# Patient Record
(demographics unavailable — no encounter records)

---
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
## 2018-02-10 NOTE — PCM.PN
- General Info


Date of Service: 02/10/18


Subjective Update: 





Fay has been stable since admission, hemoglobin has remained in the range of 

9 following hydration with no further evidence of active bleeding. EGD from 

this morning did show an area of redundant mucosa in the region of the junction 

with some erosions, old blood within the stomach, as well as some erosions in 

the antrum.


Functional Status: Reports: Pain Controlled, Urinating





- Review of Systems


General: Reports: Weakness.  Denies: Fever, Chills


Pulmonary: Reports: No Symptoms


Cardiovascular: Reports: No Symptoms


Gastrointestinal: Denies: Abdominal Pain, Difficulty Swallowing, Hematochezia, 

Melena, Nausea, Vomiting





- Patient Data


Vitals - Most Recent: 


 Last Vital Signs











Temp  97.8 F   02/10/18 10:07


 


Pulse  68   02/10/18 10:48


 


Resp  18   02/10/18 10:48


 


BP  106/60   02/10/18 10:48


 


Pulse Ox  98   02/10/18 10:48











Weight - Most Recent: 173 lb 11.588 oz


I&O - Last 24 Hours: 


 Intake & Output











 02/09/18 02/10/18 02/10/18





 22:59 06:59 14:59


 


Intake Total  1174 


 


Output Total 950 700 450


 


Balance -950 474 -450











Lab Results Last 24 Hours: 


 Laboratory Results - last 24 hr











  02/09/18 02/09/18 02/10/18 Range/Units





  20:01 20:55 01:00 


 


WBC     (4.5-11.0)  K/uL


 


RBC     (3.30-5.50)  M/uL


 


Hgb   9.4 L  8.7 L  (12.0-15.0)  g/dL


 


Hct     (36.0-48.0)  %


 


MCV     (80-98)  fL


 


MCH     (27-31)  pg


 


MCHC     (32-36)  %


 


Plt Count     (150-400)  K/uL


 


Neut % (Auto)     (36-66)  %


 


Lymph % (Auto)     (24-44)  %


 


Mono % (Auto)     (2-6)  %


 


Eos % (Auto)     (2-4)  %


 


Baso % (Auto)     (0-1)  %


 


Sodium     (140-148)  mmol/L


 


Potassium     (3.6-5.2)  mmol/L


 


Chloride     (100-108)  mmol/L


 


Carbon Dioxide     (21-32)  mmol/L


 


Anion Gap     (5.0-14.0)  mmol/L


 


BUN     (7-18)  mg/dL


 


Creatinine     (0.6-1.0)  mg/dL


 


Est Cr Clr Drug Dosing     mL/min


 


Estimated GFR (MDRD)     (>60)  


 


Glucose     ()  mg/dL


 


Calcium     (8.5-10.1)  mg/dL


 


Magnesium     (1.8-2.4)  mg/dL


 


Urine Color  Yellow    


 


Urine Appearance  Clear    


 


Urine pH  6.5    (4.5-8.0)  


 


Ur Specific Gravity  1.015    (1.008-1.030)  


 


Urine Protein  Negative    (NEGATIVE)  mg/dL


 


Urine Glucose (UA)  Normal    (NEGATIVE)  mg/dL


 


Urine Ketones  15 H    (NEGATIVE)  mg/dL


 


Urine Occult Blood  Negative    (NEGATIVE)  


 


Urine Nitrite  Negative    (NEGATIVE)  


 


Urine Bilirubin  Negative    (NEGATIVE)  


 


Urine Urobilinogen  Normal    (NORMAL)  mg/dL


 


Ur Leukocyte Esterase  Moderate    (NEGATIVE)  


 


Urine RBC  0-5    (0-5)  


 


Urine WBC  0-5    (0-5)  


 


Ur Epithelial Cells  Few    


 


Amorphous Sediment  Not seen    


 


Urine Bacteria  Not seen    


 


Urine Mucus  Not seen    














  02/10/18 02/10/18 Range/Units





  05:28 05:28 


 


WBC  13.8 H   (4.5-11.0)  K/uL


 


RBC  3.05 L   (3.30-5.50)  M/uL


 


Hgb  9.4 L   (12.0-15.0)  g/dL


 


Hct  28.5 L   (36.0-48.0)  %


 


MCV  93   (80-98)  fL


 


MCH  31   (27-31)  pg


 


MCHC  33   (32-36)  %


 


Plt Count  329   (150-400)  K/uL


 


Neut % (Auto)  68 H   (36-66)  %


 


Lymph % (Auto)  28   (24-44)  %


 


Mono % (Auto)  4   (2-6)  %


 


Eos % (Auto)  0 L   (2-4)  %


 


Baso % (Auto)  0   (0-1)  %


 


Sodium   145  (140-148)  mmol/L


 


Potassium   3.8  (3.6-5.2)  mmol/L


 


Chloride   113 H  (100-108)  mmol/L


 


Carbon Dioxide   23  (21-32)  mmol/L


 


Anion Gap   12.8  (5.0-14.0)  mmol/L


 


BUN   33 H  (7-18)  mg/dL


 


Creatinine   0.8  (0.6-1.0)  mg/dL


 


Est Cr Clr Drug Dosing   53.23  mL/min


 


Estimated GFR (MDRD)   > 60  (>60)  


 


Glucose   92  ()  mg/dL


 


Calcium   8.2 L  (8.5-10.1)  mg/dL


 


Magnesium   1.8  (1.8-2.4)  mg/dL


 


Urine Color    


 


Urine Appearance    


 


Urine pH    (4.5-8.0)  


 


Ur Specific Gravity    (1.008-1.030)  


 


Urine Protein    (NEGATIVE)  mg/dL


 


Urine Glucose (UA)    (NEGATIVE)  mg/dL


 


Urine Ketones    (NEGATIVE)  mg/dL


 


Urine Occult Blood    (NEGATIVE)  


 


Urine Nitrite    (NEGATIVE)  


 


Urine Bilirubin    (NEGATIVE)  


 


Urine Urobilinogen    (NORMAL)  mg/dL


 


Ur Leukocyte Esterase    (NEGATIVE)  


 


Urine RBC    (0-5)  


 


Urine WBC    (0-5)  


 


Ur Epithelial Cells    


 


Amorphous Sediment    


 


Urine Bacteria    


 


Urine Mucus    











Med Orders - Current: 


 Current Medications





Acetaminophen (Tylenol)  650 mg PO Q4H PRN


   PRN Reason: Pain (Mild 1-3)/fever


   Last Admin: 02/10/18 10:37 Dose:  650 mg


Levothyroxine Sodium (Synthroid)  50 mcg PO ACBREAKFAST [facility]


   Last Admin: 02/10/18 10:37 Dose:  50 mcg


Morphine Sulfate (Morphine)  2 mg IVPUSH Q2H PRN


   PRN Reason: Pain (severe 7-10)


Ondansetron HCl (Zofran)  4 mg IV Q4H PRN


   PRN Reason: Nausea/Vomiting


Pantoprazole Sodium (Protonix Iv***)  40 mg IVPUSH Q12H [facility]


Sodium Chloride (Saline Flush)  10 ml FLUSH ASDIRECTED PRN


   PRN Reason: Keep Vein Open





Discontinued Medications





Fentanyl (Sublimaze) Confirm Administered Dose 100 mcg .ROUTE .STK-MED ONE


   Stop: 02/10/18 09:00


Sodium Chloride (Normal Saline)  1,000 mls @ 999 mls/hr IV ASDIRECTED [facility]


   Last Admin: 02/09/18 18:21 Dose:  999 mls/hr


Potassium Chloride 20 meq/ (Premix)  100 mls @ 50 mls/hr IV ONETIME ONE


   Stop: 02/09/18 20:38


   Last Admin: 02/09/18 19:12 Dose:  50 mls/hr


Sodium Chloride (Normal Saline)  1,000 mls @ 125 mls/hr IV ASDIRECTED [facility]


   Last Admin: 02/09/18 22:15 Dose:  125 mls/hr


Pantoprazole Sodium 80 mg/ (Sodium Chloride)  100 mls @ 10 mls/hr IV ASDIRECTED 

[facility]


   Last Admin: 02/10/18 06:02 Dose:  10 mls/hr


Sodium Chloride (Normal Saline)  1,000 mls @ 50 mls/hr IV ASDIRECTED [facility]


   Last Admin: 02/10/18 09:49 Dose:  50 mls/hr


Lidocaine HCl (Xylocaine-Mpf 1%) Confirm Administered Dose 5 ml .ROUTE .STK-MED 

ONE


   Stop: 02/09/18 19:07


   Last Admin: 02/09/18 19:13 Dose:  2 ml


Ondansetron HCl (Zofran)  4 mg IVPUSH ONETIME ONE


   Stop: 02/09/18 18:18


   Last Admin: 02/09/18 18:24 Dose:  4 mg


Pantoprazole Sodium (Protonix Iv***)  80 mg IVPUSH ONETIME ONE


   Stop: 02/09/18 20:08


   Last Admin: 02/09/18 20:54 Dose:  80 mg


Propofol (Diprivan  20 Ml) Confirm Administered Dose 200 mg .ROUTE .STK-MED ONE


   Stop: 02/10/18 09:00











- Exam


Quality Assessment: DVT Prophylaxis


General: Alert, Oriented, Cooperative, Mild Distress


Lungs: Clear to Auscultation, Normal Respiratory Effort


Cardiovascular: Regular Rate, Regular Rhythm, No Murmurs


GI/Abdominal Exam: Soft, Non-Tender, No Organomegaly, No Distention


Extremities: Non-Tender, No Pedal Edema


Skin: Warm, Dry, Intact





- Problem List Review


Problem List Initiated/Reviewed/Updated: Yes





- My Orders


Last 24 Hours: 


My Active Orders





02/09/18 19:26


Resuscitation Status Routine 





02/09/18 20:07


Patient Status [ADT] Routine 


Ambulate [RC] QID 


Cardiac Monitoring [RC] .As Directed 


Height and Weight [RC] DAILY 


Intake and Output [RC] QSHIFT 


Notify Provider Consults [RC] ASDIRECTED 


Notify Provider Vital Signs [RC] ASDIRECTED 


Oxygen Therapy [RC] PRN 


Peripheral IV Care [RC] .AS DIRECTED 


Up With Assistance [RC] ASDIRECTED 


Up to Chair [RC] QID 


Vital Signs [RC] Q1H 


Consult to Physician [CONS] Routine 


Acetaminophen [Tylenol]   650 mg PO Q4H PRN 


Morphine   2 mg IVPUSH Q2H PRN 


Ondansetron [Zofran]   4 mg IV Q4H PRN 


Sodium Chloride 0.9% [Saline Flush]   10 ml FLUSH ASDIRECTED PRN 


Peripheral IV Insertion Adult [OM.PC] Routine 


Sequential Compression Device [OM.PC] Per Unit Routine 


VTE Pharmacological Contraindications [AST] Per Unit Routine 





02/10/18 07:30


Levothyroxine [Synthroid]   50 mcg PO ACBREAKFAST 





02/10/18 10:50


HEMOGLOBIN [HEME] Stat 


Convert IV to Saline Lock [OM.PC] Routine 





02/10/18 11:00


Pantoprazole [ProTONIX IV***]   40 mg IVPUSH Q12H 





02/11/18 05:00


BASIC METABOLIC PANEL,BMP [CHEM] Timed 


CBC WITH AUTO DIFF [HEME] Timed 














- Plan


Plan:: 





ASSESSMENT AND PLAN





UPPER GI BLEED-no further evidence of active bleeding since admission, 

hemoglobin has stabilized at 9. EGD showed redundant mucosa almost in the form 

of a large polyp at the EG junction with erosions, old blood in the stomach, 

and erosions in the antrum.


-Protonix 40 mg IV every 12 hours


-Start and maintain 2 large-bore IV sites


-Clear liquid diet, advance as tolerated


-Type and cross to hold 2 units of red blood cells


-Surgical follow-up per Dr. Marte


-Serial hemoglobin levels





ACUTE BLOOD LOSS ANEMIA


-Management as above





MAINTENANCE ISSUES


-DVT prophylaxis; SCUDs, hold on anticoagulation because of acute GI bleed


-GI prophylaxis; Protonix as above


-Hair catheter; not indicated


-Nutrition; nothing by mouth


-Nicotine dependence; not required





CODE STATUS-FULL CODE





ADMISSION STATUS-patient will be admitted to inpatient status, expect at least 

a 2 night hospital stay for evaluation and management of problems as outlined 

above.  At the time of this admission I do not reasonably expected evaluation 

and management of this problem will require more than a 96 hour hospital stay.





DISPOSITION-anticipate discharge to home after the hospital stay.





PRIMARY CARE PROVIDER-

## 2018-02-11 NOTE — PCM.DCSUM1
**Discharge Summary





- Discharge Data


Discharge Date: 02/11/18


Discharge Disposition: Home, Self-Care 01


Condition: Fair





- Discharge Diagnosis/Problem(s)


(1) Acute blood loss anemia


SNOMED Code(s): 365932755


   ICD Code: D62 - ACUTE POSTHEMORRHAGIC ANEMIA   Status: Acute   Current Visit

: Yes   





(2) Gastric erosion with bleeding


Status: Acute   Current Visit: Yes   





- Patient Summary/Data


Consults: 


 Consultations





02/09/18 20:07


Consult to Physician [CONS] Routine 


   Consulting Provider: Kurtis Marte Call Completed to Consulting Physician: Yes


   Reason for Consult: Upper GI bleed











Hospital Course: 





Fay is a 68-year-old woman who was admitted through the emergency department 

with an upper GI bleed. She reports that she's had a previous history of lower 

GI bleeds, source not identified despite previous evaluations. She felt well 

until the morning of admission, when she noted a melenic-appearing stool, but 

felt well enough to work through the day. Late afternoon and early evening she 

was spending some time with friends and attempted to stand up but felt very 

lightheaded and fell back onto the couch. She did not pass out, but quickly 

thereafter did develop some nausea and retching. She is status post previous 

Nissen fundoplication so was unable to vomit. On evaluation in the emergency 

department she was found to be initially hypotensive, that improved following 

infusion of IV fluids. Hemoglobin was 10, no other significant laboratory 

abnormalities were identified. She denies any previous history of upper GI 

bleed or ulcer disease. Has not had recent symptoms of abdominal pain.





On admission IV fluids were continued, she was given a bolus of Protonix at 80 

mg followed by continuous infusion of Protonix at 8 mg per hour. Serial 

hemoglobin levels were obtained and did drop to the range of 9 following 

hydration. There was no further evidence of active bleeding throughout her 

hospital stay. Surgical consult was obtained with Dr. Marte on the day after 

admission and an EGD was performed. She was noted to have redundant mucosa 

almost in the form of a large polyp at the EG junction. There were erosions on 

this area felt to be possible cause of recent bleeding. Old blood was noted in 

the stomach. Also found was very of erosions around the antrum biopsies were 

also obtained from this area. By the following morning she had been tolerating 

a soft diet and had been walking short distances without significant 

difficulty. She will be discharged home on oral Protonix 40 mg twice daily for 

2 weeks, then once daily thereafter. She will continue to follow a soft low 

residue diet over the next 2 weeks. Activity will be as tolerated and she will 

be started on iron supplement twice daily. Follow-up appointment will be 

scheduled with Dr. Marte within one week, CBC will be obtained at that time 

and he will review biopsy results with the patient during that appointment. She 

will return to the emergency room immediately if she notes any recurrent 

symptoms including melenic-appearing stools.








- Patient Instructions


Diet: GI Soft/Low Residue/Low Fiber


Activity: As Tolerated


Other/Special Instructions: Please schedule follow-up appointment with Dr. Marte within one week, CBC to be obtained at the time of follow-up 

appointment.





- Discharge Plan


Prescriptions/Med Rec: 


Ferrous Gluconate 324 mg PO BID #60 tablet


Pantoprazole Sodium [Protonix] 40 mg PO BID #30 tablet.


Home Medications: 


 Home Meds





Dicyclomine [Bentyl] 10 mg PO TID PRN 06/01/16 [History]


Fluticasone Propionate [Flonase] 1 inh INH DAILY PRN 06/01/16 [History]


Hydrochlorothiazide 25 mg PO BID 06/01/16 [History]


Hydrocortisone [Hydrocortisone 2.5% Crm] 1 applic TOP BID PRN 06/01/16 [History]


Levothyroxine [Synthroid] 50 mcg PO DAILY 06/01/16 [History]


Ferrous Gluconate 324 mg PO BID #60 tablet 02/11/18 [Rx]


Pantoprazole Sodium [Protonix] 40 mg PO BID #30 tablet. 02/11/18 [Rx]








Referrals: 


PCP,None [Primary Care Provider] - 





- Patient Data


Vitals - Most Recent: 


 Last Vital Signs











Temp  98.6 F   02/11/18 08:00


 


Pulse  71   02/11/18 08:00


 


Resp  21 H  02/11/18 08:00


 


BP  101/63   02/11/18 08:00


 


Pulse Ox  93 L  02/11/18 08:00











Weight - Most Recent: 173 lb 11.588 oz


I&O - Last 24 hours: 


 Intake & Output











 02/10/18 02/11/18 02/11/18





 22:59 06:59 14:59


 


Intake Total 200  


 


Output Total 500  


 


Balance -300  











Lab Results - Last 24 hrs: 


 Laboratory Results - last 24 hr











  02/10/18 02/10/18 02/10/18 Range/Units





  11:00 16:16 22:08 


 


WBC   9.2  10.0  (4.5-11.0)  K/uL


 


RBC   2.75 L  2.62 L  (3.30-5.50)  M/uL


 


Hgb  8.8 L  8.4 L  8.1 L  (12.0-15.0)  g/dL


 


Hct   26.2 L  25.2 L  (36.0-48.0)  %


 


MCV   95  96  (80-98)  fL


 


MCH   31  31  (27-31)  pg


 


MCHC   32  32  (32-36)  %


 


Plt Count   300  269  (150-400)  K/uL


 


Neut % (Auto)     (36-66)  %


 


Lymph % (Auto)     (24-44)  %


 


Mono % (Auto)     (2-6)  %


 


Eos % (Auto)     (2-4)  %


 


Baso % (Auto)     (0-1)  %


 


Sodium     (140-148)  mmol/L


 


Potassium     (3.6-5.2)  mmol/L


 


Chloride     (100-108)  mmol/L


 


Carbon Dioxide     (21-32)  mmol/L


 


Anion Gap     (5.0-14.0)  mmol/L


 


BUN     (7-18)  mg/dL


 


Creatinine     (0.6-1.0)  mg/dL


 


Est Cr Clr Drug Dosing     mL/min


 


Estimated GFR (MDRD)     (>60)  


 


Glucose     ()  mg/dL


 


Calcium     (8.5-10.1)  mg/dL














  02/11/18 02/11/18 Range/Units





  05:53 05:53 


 


WBC  8.5   (4.5-11.0)  K/uL


 


RBC  2.77 L   (3.30-5.50)  M/uL


 


Hgb  8.6 L   (12.0-15.0)  g/dL


 


Hct  26.7 L   (36.0-48.0)  %


 


MCV  96   (80-98)  fL


 


MCH  31   (27-31)  pg


 


MCHC  32   (32-36)  %


 


Plt Count  278   (150-400)  K/uL


 


Neut % (Auto)  50   (36-66)  %


 


Lymph % (Auto)  41   (24-44)  %


 


Mono % (Auto)  4   (2-6)  %


 


Eos % (Auto)  5 H   (2-4)  %


 


Baso % (Auto)  1   (0-1)  %


 


Sodium   145  (140-148)  mmol/L


 


Potassium   4.3  (3.6-5.2)  mmol/L


 


Chloride   114 H  (100-108)  mmol/L


 


Carbon Dioxide   25  (21-32)  mmol/L


 


Anion Gap   10.3  (5.0-14.0)  mmol/L


 


BUN   20 H  (7-18)  mg/dL


 


Creatinine   0.8  (0.6-1.0)  mg/dL


 


Est Cr Clr Drug Dosing   53.23  mL/min


 


Estimated GFR (MDRD)   > 60  (>60)  


 


Glucose   101  ()  mg/dL


 


Calcium   8.3 L  (8.5-10.1)  mg/dL











Med Orders - Current: 


 Current Medications





Acetaminophen (Tylenol)  650 mg PO Q4H PRN


   PRN Reason: Pain (Mild 1-3)/fever


   Last Admin: 02/11/18 07:21 Dose:  650 mg


Levothyroxine Sodium (Synthroid)  50 mcg PO ACBREAKFAST [facility]


   Last Admin: 02/11/18 07:34 Dose:  50 mcg


Morphine Sulfate (Morphine)  2 mg IVPUSH Q2H PRN


   PRN Reason: Pain (severe 7-10)


Ondansetron HCl (Zofran)  4 mg IV Q4H PRN


   PRN Reason: Nausea/Vomiting


Pantoprazole Sodium (Protonix Iv***)  40 mg IVPUSH Q12H [facility]


   Last Admin: 02/10/18 22:49 Dose:  40 mg


Sodium Chloride (Saline Flush)  10 ml FLUSH ASDIRECTED PRN


   PRN Reason: Keep Vein Open


Tramadol HCl (Ultram)  50 mg PO Q4H PRN


   PRN Reason: Pain





Discontinued Medications





Fentanyl (Sublimaze) Confirm Administered Dose 100 mcg .ROUTE .STK-MED ONE


   Stop: 02/10/18 09:00


Sodium Chloride (Normal Saline)  1,000 mls @ 999 mls/hr IV ASDIRECTED [facility]


   Last Admin: 02/09/18 18:21 Dose:  999 mls/hr


Potassium Chloride 20 meq/ (Premix)  100 mls @ 50 mls/hr IV ONETIME ONE


   Stop: 02/09/18 20:38


   Last Admin: 02/09/18 19:12 Dose:  50 mls/hr


Sodium Chloride (Normal Saline)  1,000 mls @ 125 mls/hr IV ASDIRECTED [facility]


   Last Admin: 02/09/18 22:15 Dose:  125 mls/hr


Pantoprazole Sodium 80 mg/ (Sodium Chloride)  100 mls @ 10 mls/hr IV ASDIRECTED 

[facility]


   Last Admin: 02/10/18 06:02 Dose:  10 mls/hr


Sodium Chloride (Normal Saline)  1,000 mls @ 50 mls/hr IV ASDIRECTED [facility]


   Last Admin: 02/10/18 09:49 Dose:  50 mls/hr


Lidocaine HCl (Xylocaine-Mpf 1%) Confirm Administered Dose 5 ml .ROUTE .STK-MED 

ONE


   Stop: 02/09/18 19:07


   Last Admin: 02/09/18 19:13 Dose:  2 ml


Ondansetron HCl (Zofran)  4 mg IVPUSH ONETIME ONE


   Stop: 02/09/18 18:18


   Last Admin: 02/09/18 18:24 Dose:  4 mg


Pantoprazole Sodium (Protonix Iv***)  80 mg IVPUSH ONETIME ONE


   Stop: 02/09/18 20:08


   Last Admin: 02/09/18 20:54 Dose:  80 mg


Propofol (Diprivan  20 Ml) Confirm Administered Dose 200 mg .ROUTE .STK-MED ONE


   Stop: 02/10/18 09:00











*Q Meaningful Use (DIS)





- VTE *Q


VTE Criteria *Q: 





VTE Pharmacological Contraindications *Q: Active Hemorrhage





- Stroke *Q


Stroke Criteria *Q: 








- AMI *Q


AMI Criteria *Q:

## 2018-02-12 NOTE — OR
DATE OF PROCEDURE:  02/10/2018

 

PREOPERATIVE DIAGNOSIS:  Gastrointestinal hemorrhage.

 

POSTOPERATIVE DIAGNOSES:

1. Proximal stomach mass.

2. Gastrointestinal hemorrhage.

3. Mild antral gastritis.

 

PROCEDURE:  Esophagogastroduodenoscopy with biopsy of proximal gastric mass, 
biopsy of

antrum for CLOtest and for pathology to look for Helicobacter pylori.



SURGEON:  Kurtis Marte MD.

 

ASSISTANTS:  Present for observation, Dr. Alvarado, the attending; and

Constantino Tapia MS-3.

 

ANESTHESIA:  IV anesthesia with monitored anesthesia care.

 

INDICATION:  This 68-year-old white female was admitted by Dr. Alvarado last 
night after

having melenic stools.  Her hemoglobin fell as low as 8.7.  She did receive 
blood

transfusion.  She has a history of Nissen fundoplication.  A request was made 
for upper

endoscopy.  I counseled her for this and she gave her informed consent to 
proceed.

 

DESCRIPTION OF PROCEDURE:  The patient was placed in the left lateral decubitus 
position.

IV anesthesia was administered by the Anesthesia Service.  Time-out was held.  
The flexible

video Olympus upper endoscope was passed through her mouth, down her esophagus, 
and into 

her stomach.  The scope was easily passed through the pylorus into the duodenum
, reaching 

its third portion.  The scope was then slowly withdrawn, examining the mucosa 
throughout.  The

duodenal mucosa appeared unremarkable.  The scope was brought up through the 
pylorus.  

There was some erythema in the antrum suggestive of mild antral gastritis.  We 
obtained 

biopsies of this for CLOtest, sent for pathology to look for Helicobacter 
pylori.  The scope was

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 
gastrointestinal

stromal tumor.  We did biopsy this mass.  There was nothing actively bleeding 
at the present

time.  The scope was then brought up to the GE junction.  This appeared 
unremarkable.  It

was then brought up through the unremarkable appearing esophagus and was 
removed.  She

tolerated the procedure well.

 

 

 

 

Kurtis Marte MD

DD:  02/10/2018 09:58:40

DT:  02/10/2018 11:11:27

Job #:  [id number]

MTDD

## 2018-02-17 NOTE — PCM.SURGPN
- General Info


Date of Service: 02/17/18


Date of Surgery/Procedure: 02/16/18


POD#: 1


Post-Op Diagnosis: S/P esophagogastrectomy with manny-en-y and 

esophagojejunostomy with removal of mucosal mass


Admission Diagnosis/Problem: Gastrectomy (Removal of gastric mucosal mass )


Functional Status: Reports: Pain Controlled





- Review of Systems


General: Reports: No Symptoms


HEENT: Reports: No Symptoms


Pulmonary: Reports: No Symptoms


Cardiovascular: Reports: No Symptoms


Gastrointestinal: Reports: Abdominal Pain, Other (Complaining of some regurg 

and burpgin but no dirrhea, vomiting or nausea )


Genitourinary: Reports: No Symptoms


Musculoskeletal: Reports: No Symptoms


Skin: Reports: No Symptoms


Neurological: Reports: No Symptoms


Psychiatric: Reports: No Symptoms





- Patient Data


Vitals - Most Recent: 


 Last Vital Signs











Temp  37.2 C   02/17/18 07:38


 


Pulse  64   02/17/18 07:38


 


Resp  15   02/17/18 07:38


 


BP  125/71   02/17/18 07:38


 


Pulse Ox  95   02/17/18 07:50











Weight - Most Recent: 75.013 kg


I&O - Last 24 Hours: 


 Intake & Output











 02/16/18 02/17/18 02/17/18





 22:59 06:59 14:59


 


Intake Total 50 2528 


 


Output Total 490 565 


 


Balance -440 1963 











Lab Results Last 24 Hrs: 


 Laboratory Results - last 24 hr











  02/16/18 02/16/18 02/16/18 Range/Units





  11:00 11:00 11:00 


 


WBC  8.4    (4.5-11.0)  K/uL


 


RBC  3.37    (3.30-5.50)  M/uL


 


Hgb  10.2 L    (12.0-15.0)  g/dL


 


Hct  31.6 L    (36.0-48.0)  %


 


MCV  94    (80-98)  fL


 


MCH  30    (27-31)  pg


 


MCHC  32    (32-36)  %


 


Plt Count  476 H    (150-400)  K/uL


 


Sodium   140   (140-148)  mmol/L


 


Potassium   3.5 L   (3.6-5.2)  mmol/L


 


Chloride   105   (100-108)  mmol/L


 


Carbon Dioxide   25   (21-32)  mmol/L


 


Anion Gap   13.5   (5.0-14.0)  mmol/L


 


BUN   15   (7-18)  mg/dL


 


Creatinine   0.9   (0.6-1.0)  mg/dL


 


Est Cr Clr Drug Dosing   47.32   mL/min


 


Estimated GFR (MDRD)   > 60   (>60)  


 


Glucose   109 H   ()  mg/dL


 


Calcium   9.1   (8.5-10.1)  mg/dL


 


Phosphorus   4.2   (2.5-4.9)  mg/dL


 


Magnesium   2.1   (1.8-2.4)  mg/dL


 


Total Bilirubin   0.4   (0.2-1.0)  mg/dL


 


AST   22   (15-37)  U/L


 


ALT   30   (12-78)  U/L


 


Alkaline Phosphatase   62   ()  U/L


 


Total Protein   6.9   (6.4-8.2)  g/dL


 


Albumin   4.1   (3.4-5.0)  g/dL


 


Globulin   2.8   (2.3-3.5)  g/dL


 


Albumin/Globulin Ratio   1.5   (1.2-2.2)  


 


TSH, Ultra Sensitive   1.801   (0.358-3.740)  uIU/mL


 


Blood Type    O POSITIVE  


 


Gel Antibody Screen    Negative  











Med Orders - Current: 


 Current Medications





Acetaminophen (Tylenol)  650 mg PO Q6H [facility]


   Last Admin: 02/17/18 05:12 Dose:  650 mg


Celecoxib (Celebrex)  200 mg PO DAILY@0800 [facility]


   Last Admin: 02/17/18 07:51 Dose:  200 mg


Cyanocobalamin (Vitamin B12)  1,000 mcg IM ONETIME ONE


   Stop: 02/18/18 09:01


Dextrose/Water (Dextrose 50% In Water)  50 ml IVPUSH ONETIME PRN


   PRN Reason: ACCUCHECK LESS THAN 70


Diphenhydramine HCl (Benadryl)  25 - 50 mg IVPUSH Q4H PRN


   PRN Reason: ITCHING


Gabapentin (Neurontin)  300 mg PO TID [facility]


   Last Admin: 02/16/18 20:07 Dose:  300 mg


Glucagon (Glucagen)  1 mg IM ONETIME PRN


   PRN Reason: ACCUCHECK LESS THAN 70


Heparin Sodium (Porcine) (Heparin Sodium)  5,000 units SUBCUT Q12H [facility]


   Last Admin: 02/17/18 07:52 Dose:  5,000 units


Hydromorphone HCl (Dilaudid Pca 15 Mg In Ns 30 Ml)  0 mg IV ASDIRECTED PRN; 

Protocol


   PRN Reason: Pain


Hydroxyzine HCl (Vistaril)  75 - 100 mg IM Q4H PRN


   PRN Reason: pain


Cefoxitin Sodium 2 gm/ Sodium (Chloride)  50 mls @ 100 mls/hr IV Q6H [facility]


   Stop: 02/17/18 18:29


   Last Admin: 02/17/18 05:12 Dose:  100 mls/hr


Dextrose/Lactated Ringer's (Dextrose 5%-Lactated Ringers)  1,000 mls @ 100 mls/

hr IV ASDIRECTED [facility]


Multivitamins/Minerals 10 ml/Thiamine HCl 200 mg/ Chromium/Copper/Manganese/

Seleni/Zn 1 ml/ Dextrose/Lactated Ringer's  1,013 mls @ 100 mls/hr IV DAILY@

1600 [facility]


Insulin Aspart (Novolog)  0 unit SUBCUT Q6H PRN; Protocol


   PRN Reason: PER CORRECTIONAL DOSING


   Last Admin: 02/17/18 04:19 Dose:  5 unit


Labetalol HCl (Normodyne)  5 - 15 mg IVPUSH Q1H PRN


   PRN Reason: SBP over 160 OR DBP over 95


Metoclopramide HCl (Reglan)  10 mg IVPUSH Q6H PRN


   PRN Reason: NAUSEA NOT CONTROL BY ZOFRAN


Miscellaneous Information (Remove Patch)  1 ea TRDERM ONETIME ONE


   Stop: 02/18/18 10:01


Naloxone HCl (Narcan)  0.1 mg IV ASDIRECTED PRN


   PRN Reason: decreased respiratory rate


Scopolamine Patch (Check)  1 each TOP DAILY [facility]


   Stop: 02/18/18 10:00


   Last Admin: 02/16/18 17:36 Dose:  1 each


Ondansetron HCl (Zofran)  4 mg IVPUSH Q4H PRN


   PRN Reason: Nausea/Vomiting


   Last Admin: 02/16/18 22:34 Dose:  4 mg


Pantoprazole Sodium (Protonix Iv***)  40 mg IVPUSH Q24H [facility]


   Last Admin: 02/16/18 17:30 Dose:  40 mg


Scopolamine (Transderm-Scop)  1.5 mg TOP Q72H [facility]


   Stop: 02/18/18 10:00


   Last Admin: 02/16/18 10:56 Dose:  1.5 mg





Discontinued Medications





Acetaminophen (Tylenol Extra Strength)  1,000 mg PO ONETIME ONE


   Stop: 02/16/18 09:31


   Last Admin: 02/16/18 10:57 Dose:  1,000 mg


Cefoxitin Sodium (Mefoxin) Confirm Administered Dose 2 gm .ROUTE .STK-MED ONE


   Stop: 02/16/18 07:09


   Last Admin: 02/16/18 13:50 Dose:  2 gm


Celecoxib (Celebrex)  200 mg PO ONETIME ONE


   Stop: 02/16/18 09:31


   Last Admin: 02/16/18 10:57 Dose:  200 mg


Ropivacaine 38 ml/Dexamethasone 8 mg/Epinephrine HCl 0.4 mg/ Sodium Chloride 

39.6 ml  0 ml NERVRT ASDIRECTED [facility]


   Last Admin: 02/16/18 13:08 Dose:  2 syringe


Dexamethasone (Dexamethasone) Confirm Administered Dose 4 mg .ROUTE .STK-MED ONE


   Stop: 02/16/18 11:58


Fentanyl (Sublimaze) Confirm Administered Dose 250 mcg .ROUTE .STK-MED ONE


   Stop: 02/16/18 11:57


Gabapentin (Neurontin)  300 mg PO ONETIME ONE


   Stop: 02/16/18 09:31


   Last Admin: 02/16/18 10:57 Dose:  300 mg


Glycopyrrolate () Confirm Administered Dose 1 mg .ROUTE .STK-MED ONE


   Stop: 02/16/18 11:58


Cefoxitin Sodium 2 gm/ Sodium (Chloride)  50 mls @ 100 mls/hr IV ONETIME ONE


   Stop: 02/16/18 10:29


   Last Admin: 02/16/18 12:54 Dose:  100 mls/hr


Lidocaine HCl/Dextrose (Lidocaine 2 Gm/D5w 500 Ml)  2 gm in 500 mls @ 30 mls/hr 

IV .[id number] DANAE


   PRN Reason: 2 MG/MIN


Ketamine HCl 100 mg/ Sodium (Chloride)  100 mls @ 15 mls/hr IV ASDIRECTED [facility]


   PRN Reason: 5 MCG/KG/MIN


Lidocaine HCl/Dextrose (Lidocaine 2 Gm/D5w 500 Ml)  2 gm in 500 mls @ 22.5 mls/

hr IV .[id number] DANAE


   PRN Reason: 1.5 MG/MIN


   Stop: 02/16/18 23:59


   Last Admin: 02/16/18 17:21 Dose:  Not Given


Dextrose/Lactated Ringer's (Dextrose 5%-Lactated Ringers)  1,000 mls @ 100 mls/

hr IV ASDIRECTED [facility]


   Last Admin: 02/16/18 10:59 Dose:  100 mls/hr


Lactated Ringer's (Ringers, Lactated) Confirm Administered Dose 1,000 mls @ as 

directed .ROUTE .STK-MED ONE


   Stop: 02/16/18 12:42


Dextrose/Lactated Ringer's (Dextrose 5%-Lactated Ringers)  1,000 mls [facility]


   Last Admin: 02/17/18 06:18 Dose:  175 mls/hr


Multivitamins/Minerals 10 ml/Thiamine HCl 200 mg/ Chromium/Copper/Manganese/

Seleni/Zn 1 ml/ Dextrose/Lactated Ringer's  1,013 mls @ 175 mls/hr IV DAILY@

1600 [facility]


   Last Admin: 02/16/18 17:30 Dose:  175 mls/hr


Iohexol (Omnipaque-300)  50 ml PO .AS DIRECTED PRN


   PRN Reason: RADIOLOGY EXAM


   Stop: 02/18/18 02:08


[facility]  [facility]


Lidocaine HCl (Xylocaine 2%)  90 mg IVPUSH ONETIME ONE


   Stop: 02/16/18 12:01


   Last Admin: 02/16/18 17:22 Dose:  Not Given


Neostigmine Methylsulfate (Neostigmine) Confirm Administered Dose 5 mg .ROUTE 

.STK-MED ONE


   Stop: 02/16/18 11:58


Ondansetron HCl (Zofran) Confirm Administered Dose 4 mg .ROUTE .STK-MED ONE


   Stop: 02/16/18 11:58


Propofol (Diprivan  20 Ml) Confirm Administered Dose 200 mg .ROUTE .STK-MED ONE


   Stop: 02/16/18 11:58


Rocuronium Bromide (Zemuron) Confirm Administered Dose 50 mg .ROUTE .STK-MED ONE


   Stop: 02/16/18 11:58


Succinylcholine Chloride (Succinylcholine In Ns Pf) Confirm Administered Dose 

200 mg .ROUTE .STK-MED ONE


   Stop: 02/16/18 11:58











- Exam


Wound/Incisions: Healing Well


General: Alert, Oriented


Neck: Supple


Lungs: Clear to Auscultation, Normal Respiratory Effort


Cardiovascular: Regular Rate, Regular Rhythm


GI/Abdominal Exam: Soft, No Distention, Tender


Skin: Warm, Dry, Intact


Neurological: No New Focal Deficit


Psy/Mental Status: Alert, Normal Affect, Normal Mood





- Problem List Review


Problem List Initiated/Reviewed/Updated: Yes





- My Orders


Last 24 Hours: 


 Active Orders 24 hr











 Category Date Time Status


 


 Patient Status [ADT] Routine ADT  02/16/18 16:20 Active


 


 Ambulate [RC] ASDIRECTED Care  02/16/18 16:58 Active


 


 Cardiac Monitoring Discontinue [RC] Click to Edit Care  02/17/18 07:35 Active


 


 Cardiac Monitoring Discontinue [RC] Click to Edit Care  02/17/18 09:00 Active


 


 Cardiac Monitoring [RC] .As Directed Care  02/16/18 16:58 Active


 


 Communication Order [RC] ASDIRECTED Care  02/18/18 04:00 Active


 


 Communication Order [RC] Q4H Care  02/16/18 16:58 Active


 


 Communication Order [RC] ROUTINE Care  02/16/18 16:58 Active


 


 Communication Order [RC] ROUTINE Care  02/17/18 07:37 Active


 


 Communication Order [RC] STAT Care  02/16/18 14:49 Active


 


 Drain Management [RC] ASDIRECTED Care  02/16/18 16:58 Active


 


 Head of Bed Elevation [RC] CONTINUOUS Care  02/16/18 16:58 Active


 


 IS (RT) [RT Incentive Spirometry] [RC] ASDIRECTED Care  02/16/18 09:30 Active


 


 Insert Urinary Catheter [OM.PC] Per Unit Routine Care  02/16/18 16:58 Ordered


 


 Insert Urinary Catheter [OM.PC] Per Unit Routine Care  02/16/18 16:58 Ordered


 


 Intake and Output [RC] ASDIRECTED Care  02/16/18 16:58 Active


 


 Notify Provider Intake and Out [RC] ASDIRECTED Care  02/16/18 16:58 Active


 


 Notify Provider [RC] PRN Care  02/16/18 14:49 Active


 


 Oxygen Therapy [RC] ASDIRECTED Care  02/16/18 16:58 Active


 


 POC Glucose [Blood Glucose Check, Bedside] [RC] Q6HR Care  02/16/18 22:00 

Active


 


 Pneumonia Education [RC] UPON Care  02/16/18 16:58 Active


 


 Pulse Oximetry [RC] CONTINUOUS Care  02/16/18 14:49 Active


 


 RT BiPAP/CPAP [RC] ASDIRECTED Care  02/16/18 16:58 Active


 


 RT Incentive Spirometry [RC] Q1HWA Care  02/16/18 16:58 Active


 


 Turn, Cough, Deep Breathe [RC] Q1HWA Care  02/16/18 16:58 Active


 


 Up to Chair [RC] TIDMEALS Care  02/16/18 16:58 Active


 


 Vital Signs [RC] PER UNIT ROUTINE Care  02/16/18 16:58 Active


 


 Consult to Bariatric Services [CONS] Routine Cons  02/16/18 16:58 Active


 


 Consult to Dietician [CONS] Routine Cons  02/19/18 09:00 Active


 


 Respiratory Care Assess and Treatment [CONS] Routine Cons  02/16/18 16:58 

Active


 


 Bariatric Diet [DIET] Diet  02/16/18 Dinner Active


 


 UGI wo KUB [CR] Timed Exams  02/17/18 04:00 Ordered


 


 GLUCOSE POC LAB TO COLLECT [POC] Q6H Lab  02/17/18 10:00 Ordered


 


 GLUCOSE POC LAB TO COLLECT [POC] Q6H Lab  02/17/18 16:00 Ordered


 


 GLUCOSE POC LAB TO COLLECT [POC] Q6H Lab  02/17/18 22:00 Ordered


 


 GLUCOSE POC LAB TO COLLECT [POC] Q6H Lab  02/18/18 04:00 Ordered


 


 GLUCOSE POC LAB TO COLLECT [POC] Q6H Lab  02/18/18 10:00 Ordered


 


 GLUCOSE POC LAB TO COLLECT [POC] Q6H Lab  02/18/18 16:00 Ordered


 


 GLUCOSE POC LAB TO COLLECT [POC] Q6H Lab  02/18/18 22:00 Ordered


 


 GLUCOSE POC LAB TO COLLECT [POC] Q6H Lab  02/19/18 04:00 Ordered


 


 GLUCOSE POC LAB TO COLLECT [POC] Q6H Lab  02/19/18 10:00 Ordered


 


 GLUCOSE POC LAB TO COLLECT [POC] Q6H Lab  02/19/18 16:00 Ordered


 


 GLUCOSE POC LAB TO COLLECT [POC] Q6H Lab  02/19/18 22:00 Ordered


 


 GLUCOSE POC LAB TO COLLECT [POC] Q6H Lab  02/20/18 04:00 Ordered


 


 GLUCOSE POC LAB TO COLLECT [POC] Q6H Lab  02/20/18 10:00 Ordered


 


 GLUCOSE POC LAB TO COLLECT [POC] Q6H Lab  02/20/18 16:00 Ordered


 


 GLUCOSE POC LAB TO COLLECT [POC] Q6H Lab  02/20/18 22:00 Ordered


 


 GLYCOSYLATED HEMOGLOBIN,HGBA1C [CHEM] Routine Lab  02/17/18 07:35 Ordered


 


 Acetaminophen [Tylenol] Med  02/16/18 18:00 Active





 650 mg PO Q6H   


 


 Celecoxib [CeleBREX] Med  02/17/18 08:00 Active





 200 mg PO DAILY@0800   


 


 Cyanocobalamin (Vitamin B12) [Vitamin B12] Med  02/18/18 09:00 Once





 1,000 mcg IM ONETIME ONE   


 


 Dextrose 5%-Lactated Ringers 1,000 ml Med  02/17/18 07:45 Active





 IV ASDIRECTED   


 


 Dextrose 50% in Water Med  02/16/18 18:00 Active





 50 ml IVPUSH ONETIME PRN   


 


 Gabapentin [Neurontin] Med  02/16/18 21:00 Active





 300 mg PO TID   


 


 Glucagon,Human Recombinant [GlucaGen] Med  02/16/18 18:00 Active





 1 mg IM ONETIME PRN   


 


 HYDROmorphone/Normal Saline [Dilaudid PCA 15 MG in NS Med  02/16/18 14:49 

Active





 30 ML]   





 See Protocol  IV ASDIRECTED PRN   


 


 Heparin Sodium Med  02/16/18 20:00 Active





 5,000 units SUBCUT Q12H   


 


 Insulin Aspart [NovoLOG] Med  02/16/18 18:00 Active





 0 unit SUBCUT Q6H PRN   


 


 Labetalol [Normodyne] Med  02/16/18 18:00 Active





 5 - 15 mg IVPUSH Q1H PRN   


 


 MVI, Adult with Vitamin K [Infuvite Adult] 10 ml Med  02/17/18 16:00 Active





 Thiamine [Vitamin B-1] 200 mg   





 Chromium/Copper/[facility]/Selen/Zn [Multitrace-5 Concentrate   





 ] 1 ml   





 Dextrose 5%-Lactated Ringers 1,000 ml   





 IV DAILY@1600   


 


 Metoclopramide [Reglan] Med  02/16/18 18:00 Active





 10 mg IVPUSH Q6H PRN   


 


 Naloxone [Narcan] Med  02/16/18 15:01 Active





 0.1 mg IV ASDIRECTED PRN   


 


 Non-Formulary Medication [NF Drug] Med  02/16/18 15:00 Active





 1 each TOP DAILY   


 


 Ondansetron [Zofran] Med  02/16/18 18:00 Active





 4 mg IVPUSH Q4H PRN   


 


 Pantoprazole [ProTONIX IV***] Med  02/16/18 18:00 Active





 40 mg IVPUSH Q24H   


 


 Remove Patch Med  02/18/18 10:00 Once





 1 ea TRDERM ONETIME ONE   


 


 Scopolamine [Transderm-Scop] Med  02/16/18 09:30 Active





 1.5 mg TOP Q72H   


 


 cefOXitin [Mefoxin] 2 gm Med  02/16/18 18:00 Active





 Sodium Chloride 0.9% [Normal Saline] 50 ml   





 IV Q6H   


 


 diphenhydrAMINE [Benadryl] Med  02/16/18 18:00 Active





 25 - 50 mg IVPUSH Q4H PRN   


 


 hydrOXYzine HCl [Vistaril] Med  02/16/18 18:00 Active





 75 - 100 mg IM Q4H PRN   


 


 Abdominal Binder [OM.PC] Routine Oth  02/16/18 16:58 Ordered


 


 Medication Discontinuation Instructions [OM.PC] Stat Oth  02/16/18 14:49 

Ordered


 


 Oral Care [OM.PC] BID Oth  02/16/18 17:00 Ordered


 


 Oral Care [OM.PC] BID Oth  02/17/18 17:00 Ordered


 


 PT Screening [OM.PC] Routine Oth  02/16/18 16:58 Active


 


 SCD [Sequential Compression Device] [OM.PC] Routine Oth  02/16/18 09:30 Ordered


 


 Specialty Bed [OM.PC] Routine Oth  02/16/18 16:58 Ordered


 


 Resuscitation Status Routine Resus Stat  02/16/18 16:58 Ordered








 Medication Orders





Acetaminophen (Tylenol)  650 mg PO Q6H [facility]


   Last Admin: 02/17/18 05:12  Dose: 650 mg


   Admin: 02/16/18 23:36  Dose: 650 mg


   Admin: 02/16/18 17:30  Dose: 650 mg


Celecoxib (Celebrex)  200 mg PO DAILY@0800 [facility]


   Last Admin: 02/17/18 07:51  Dose: 200 mg


Cyanocobalamin (Vitamin B12)  1,000 mcg IM ONETIME ONE


   Stop: 02/18/18 09:01


Dextrose/Water (Dextrose 50% In Water)  50 ml IVPUSH ONETIME PRN


   PRN Reason: ACCUCHECK LESS THAN 70


Diphenhydramine HCl (Benadryl)  25 - 50 mg IVPUSH Q4H PRN


   PRN Reason: ITCHING


Gabapentin (Neurontin)  300 mg PO TID [facility]


   Last Admin: 02/16/18 20:07  Dose: 300 mg


Glucagon (Glucagen)  1 mg IM ONETIME PRN


   PRN Reason: ACCUCHECK LESS THAN 70


Heparin Sodium (Porcine) (Heparin Sodium)  5,000 units SUBCUT Q12H [facility]


   Last Admin: 02/17/18 07:52  Dose: 5,000 units


   Admin: 02/16/18 20:08  Dose: 5,000 units


Hydromorphone HCl (Dilaudid Pca 15 Mg In Ns 30 Ml)  0 mg IV ASDIRECTED PRN; 

Protocol


   PRN Reason: Pain


Hydroxyzine HCl (Vistaril)  75 - 100 mg IM Q4H PRN


   PRN Reason: pain


Cefoxitin Sodium 2 gm/ Sodium (Chloride)  50 mls @ 100 mls/hr IV Q6H [facility]


   Stop: 02/17/18 18:29


   Last Admin: 02/17/18 05:12  Dose: 100 mls/hr


   Admin: 02/16/18 23:36  Dose: 100 mls/hr


   Admin: 02/16/18 17:30  Dose: 100 mls/hr


Dextrose/Lactated Ringer's (Dextrose 5%-Lactated Ringers)  1,000 mls @ 100 mls/

hr IV ASDIRECTED [facility]


Multivitamins/Minerals 10 ml/Thiamine HCl 200 mg/ Chromium/Copper/Manganese/

Seleni/Zn 1 ml/ Dextrose/Lactated Ringer's  1,013 mls @ 100 mls/hr IV DAILY@

1600 [facility]


Insulin Aspart (Novolog)  0 unit SUBCUT Q6H PRN; Protocol


   PRN Reason: PER CORRECTIONAL DOSING


   Last Admin: 02/17/18 04:19  Dose: 5 unit


   Admin: 02/16/18 17:30  Dose: 5 unit


Labetalol HCl (Normodyne)  5 - 15 mg IVPUSH Q1H PRN


   PRN Reason: SBP over 160 OR DBP over 95


Metoclopramide HCl (Reglan)  10 mg IVPUSH Q6H PRN


   PRN Reason: NAUSEA NOT CONTROL BY ZOFRAN


Miscellaneous Information (Remove Patch)  1 ea TRDERM ONETIME ONE


   Stop: 02/18/18 10:01


Naloxone HCl (Narcan)  0.1 mg IV ASDIRECTED PRN


   PRN Reason: decreased respiratory rate


Scopolamine Patch (Check)  1 each TOP DAILY [facility]


   Stop: 02/18/18 10:00


   Last Admin: 02/16/18 17:36  Dose: 1 each


Ondansetron HCl (Zofran)  4 mg IVPUSH Q4H PRN


   PRN Reason: Nausea/Vomiting


   Last Admin: 02/16/18 22:34  Dose: 4 mg


Pantoprazole Sodium (Protonix Iv***)  40 mg IVPUSH Q24H [facility]


   Last Admin: 02/16/18 17:30  Dose: 40 mg


Scopolamine (Transderm-Scop)  1.5 mg TOP Q72H [facility]


   Stop: 02/18/18 10:00


   Last Admin: 02/16/18 10:56  Dose: 1.5 mg











- Plan


Plan                        (Free Text/Narrative):: 


 Brenda Luz is a 68 year old female with a past medical history of a 

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 

from a esophagogastrectomy, esophagojejunostomy with manny-en-y and removal of 

her mass. Last night the patient was slightly lethargic and the lidocaine drip 

was stopped. This morning she is still lethargic but much less so. She is doing 

well with some mild epigastric tenderness, burping and what she describes as 

regurgitation of her sips of water. She was also found to be weak this morning 

and thus her Upper GI study was no performed yet. The patient had no further 

concerns or questions at this time.





# Epigastric abdominal pain- most likely related to her surgery


- PCA is ordered and if the patient needs will give 


- Continue Mefoxin 


- Tylenol PRN


- Flexeril PRN 





# Paralytic ileus- there were no bowel sounds appreciated on exam and the 

patient denied any gas or stool passage. Most likely due to post operative ileus

, which should resolve today or tomorrow. This is exacerbated by her limited 

step 1 diet due to the gastric bypass because of lack of input. An Upper Gi 

study will be done today. Will continue to monitor the patient for increased 

abdominal pain, fever, intractable vomiting and changes in her progression. 





E.N.E.R.G.Y protocol


- lidocaine today


- D/c scopolomine patch today


- Continue Celebrex 200 mg 


- Continue gabapentin 300 TID


- Continue IV Protonix 40mg Q24hrs 


- 1000 mcg B12 


- Contiue Infuvite 


- Dietary consulted, thank you! 





Chronic issues 


# Hypertension


- Continue home labatalol 





# Diabetes- The patient did have some high sugars last evening. 


- Continue SSI


- Get Hgb A1c 





VTE PPX- 5000 units heparin Q12 hours. Pt advised to continue moving around.


Pneumonia PPX- Pt advised to walk around and use incentive spirometry 10 times 

per hour 


Code status: full


Fluids: 100 ml/hr 


Diet: step 1 diet 


Nausea: Zofran Q4hr or [place] Q6hr PRN 


Dispo:Patient will most likely remain in the hospital for the next day or two.

## 2018-02-19 NOTE — PN
DATE OF SERVICE:  02/19/2018

 

SUBJECTIVE:  Fay is postop day 3.  Her pain has been controlled.  No more confusion.  Oral

intake 890.  TRE drain put out 120 mL.  She consumed for breakfast, lunch, and dinner of

100%, 75%, and 60%.  Snack percent consumed 100%. Up ambulating.  Vital signs have been

stable.  She has not had a bowel movement yet.

 

OBJECTIVE:  GENERAL:  Fay is a 68-year-old female. Alert, orientated.

VITAL SIGNS:  TPR 96.7, 64, 18, blood pressure 124/70.

HEENT:  Negative.

NECK:  Supple.

HEART:  Regular rate and rhythm.

LUNGS:  Clear.

ABDOMEN:  Dressings dry and intact.  Abdominal binder is on.

EXTREMITIES:  Without peripheral edema.

 

ASSESSMENT:  Laparoscopic esophagogastrectomy with Chula-en-Y and esophagojejunostomy with

removal of mucosa mass.  Date of surgery, 02/16/2018, Rosendo Cobb MD.

 

PLAN:

1. Milk of magnesia 30 mL now.

2. Dulcolax 2 tabs 1 hour after milk of magnesia, then start with a Dulcolax 2 tabs b.i.d.

    until bowel movement.

3. Saline lock IV.

4. We will evaluate p.r.n. or in a.m.

 

 

 

 

Dorys Templeton PA-C

DD:  02/19/2018 16:32:29

DT:  02/19/2018 18:43:08

Job #:  [id number]

## 2018-02-19 NOTE — CR
Limited upper GI

 

The patient is status post Chula-en-Y gastric bypass. There are left upper quadrant drains in place. T
here is no extravasation of contrast. The gastric pouch empties readily into a nondilated Chula limb. 
No complications are evident.

 

Impression:

1. Status post Chula-en-Y gastric bypass without evidence for complication.

## 2018-02-19 NOTE — PN
DATE OF SERVICE:  02/18/2018

 

The patient has been afebrile with stable vital signs.  Her upper GI x-ray looked good

yesterday and oral intake was only around 350 mL, I think that will improve today.  We will

go up to step-2 diet without solids.  Her blood sugars and hemoglobin A1c have been good, so

we will discontinue the Accu-Cheks and otherwise maximize activity and work with pulmonary

toilet.  She may be ready for home tomorrow depending on the adequacy of oral intake.

 

 

 

 

Rosendo Cobb MD

DD:  02/18/2018 07:15:15

DT:  02/19/2018 12:15:07

Job #:  [id number]

## 2018-02-20 NOTE — DISCH
FINAL DIAGNOSES:

1. Submucosal mass, proximal stomach (final pathology pending).

2. Paraesophageal diaphragmatic hernia.

3. History of gastroesophageal reflux disease.

4. History of depression and anxiety.

5. Treated hypothyroidism.

 

OPERATIVE PROCEDURES:  This was done on 02/16/2018; diagnostic laparoscopy with lysis of

adhesions and;

1. Esophagogastrectomy with Chula-en-Y esophagojejunostomy.

2. Repair of recurrent paraesophageal diaphragmatic hernia.

 

HOSPITAL COURSE:  This 68-year-old female is presenting with some GI bleeding recently.

Upper GI endoscopy showed a submucosal mass, just below her previous fundoplication.

Biopsies of this were, as expected, negative.  She was having some bleeding from erosions

over this.  A CT scan was obtained and did show an area of increased density in the area of

the mass, i.e., this was not a lipoma by CT criteria, but something with somewhat denser

tissue than the surrounding gastric wall.  On the date of surgery, the patient underwent

esophagogastrectomy with Chula-en-Y gastrojejunostomy.  The recurrent paraesophageal hernia

was also repaired.  Postoperatively, she had no major problems.  Bowel function was a little

bit sluggish getting along.  Presently, she is tolerating step-2 diet and will continue that

until the first appointment.  The first appointment will be with Dorys Templeton at [facility] [place] [facility] on 02/28/2018 with Dietary to see the patient at that time as well.

 

DISCHARGE MEDICATIONS:  Medications are as per the medication discharge sheet.

## 2018-02-26 NOTE — OR
DATE OF PROCEDURE:  02/16/2018

 

PREOPERATIVE DIAGNOSIS:  Proximal gastric submucosal mass.

 

POSTOPERATIVE DIAGNOSES:

1. Proximal gastric submucosal mass.

2. Recurrent paraesophageal diaphragmatic hernia.

 

OPERATIVE PROCEDURES:  Diagnostic laparoscopy with lysis of adhesions and:

1. Esophagogastrectomy with Chula-en-Y esophagojejunostomy (91766).

2. Repair of recurrent paraesophageal diaphragmatic hernia (77998).

 

ANESTHESIA:  General.

 

ASSISTANTS:  Dorys Templeton PA-C, and HAN Ospina3.

 

INDICATION FOR PROCEDURE:  This is a 68-year-old female presenting with some GI bleeding the

last week.  This showed what appeared to be a large submucosal mass in the proximal stomach.

This was located just below previous Nissen fundoplication.  CT scan showed this mass to be

not fat density, in fact, appeared to be somewhat denser than the adjacent gastric wall.

Given this, this is to be treated as a potentially neoplastic submucosal mass.  The plan

will be to proceed with esophagogastrectomy which can likely be accomplished

laparoscopically.  Chula-en-Y esophagojejunostomy will be planned.  Potential risks of the

procedure including bleeding, infection, leaks from various GI tract closures, possible

recurrence of the proximal tumor over time as well as possibility of cardiopulmonary,

septic, or hemorrhagic complications leading to death were all discussed, and the patient

wishes to proceed.

 

DETAILS OF PROCEDURE:  The patient was taken to the operating room and placed in a supine

position.  After general endotracheal anesthesia was induced, the patient was converted to a

lithotomy position.  Hair catheter was inserted, which was removed at the conclusion of the

procedure, and the abdomen was prepped and draped.

 

At 15 cm inferior and 5 cm left of xiphoid process, a transverse incision was made, and the

peritoneal cavity entered under direct vision with an Optiview trocar and inflated to 15

mmHg pressure with CO2.  Laparoscope was then reinserted.  No underlying trocar insertion

site injuries were seen.  Following this, 5 additional trocars were placed across the upper

and mid abdomen and general exploration was undertaken.  The patient was noted to have some

adhesions in the previous Nissen fundoplication.  These were taken down with Harmonic

scalpel and this allowed dissection of the liver anterior to the area of the dissection.  At

this point, the finger dissection above the fundoplication in the distal esophagus was

established with a combination of Harmonic scalpel and blunt dissection.  Once this was

completed from the right toward the left side, the proximal esophagus was divided with SANDRA

black loads.

 

Following this then, the lesser curvature of the stomach was entered just above the incisura

angularis.  It was notable that there was no lymphadenopathy present and apart from possible

lymphoma and early potential tumors in this location, which will be of submucosal in nature,

will likely be associated with a lymph node disease.  Given this then, the plane of

dissection continued directly along the side of the stomach, preserving the left gastric

vessel.  This continued up to the point of division of the esophagus medially.  The

dissection began with the stomach and then continued out laterally towards the left and the

stomach divided with a series of SANDRA black and purple loads.  This was continued up along

the area of the uppermost fundus.  The short gastric vessels had previously been divided as

a result of the Nissen fundoplication.  At that point, the specimen consisting of

esophagogastrectomy was completely freed up.  Both staple lines appeared to be intact.

 

The patient was noted to have a recurrent paraesophageal diaphragmatic hernia with prolapse

of some perigastric fat posterior to the defect.  This was reduced and the peritoneum

reflected downward and excised, and likewise reflected down.  The diaphragmatic hernia was

then closed with series of 0 Ethibond seromuscular stitch reinforced with PTFE pledgets.

 

At this point, the small bowel was identified at the ligament of Treitz.  The small bowel

was then traced out 50 cm distal to that point, where it was divided transversely with a SANDRA

stapler.  The small bowel was then traced out an additional 125 cm, where the side-to-side

enteroenterostomy was accomplished with internal firing of the Endo-SANDRA 60-mm stapler,

common opening was then closed transversely with the same stapler and angles anastomosed,

and mesenteric defect approximated with some 0 Ethibond stitch along with fibrin sealant.

The divided end of the Chula limb was then easily brought up to the divided esophagus without

tension through an antecolic and antegastric approach.

 

The anvil of a 25-mm EEA stapler was attached to a [place] sump type tube.  The latter was

brought down through the mouth and taken out through a small opening in the divided end of

the esophagus, allowing the anvil likewise to be pulled down to within the end of the

esophagus.  Then, the Chula limb was then opened and main body of the EEA stapler passed

several centimeters in the lumen of the small bowel, brought up the anvil and united with

it, thus creating the esophagojejunostomy.  Upon removal of the stapler, double donuts of

mucosa were noted within it, and the small bowel was closed off with a vascular staple line.

A leak test was accomplished at the esophagojejunostomy with injection of 120 mL of air in

the gastric pouch through an orogastric tube while that area was submerged in cefoxitin-

containing saline solution.  No leaks were identified.  One Christian-Florence drain was then

placed adjacent to the esophagojejunostomy and taken out of subcostal trocar sites laterally

on the left side and, at that point, trocars were removed, and the peritoneal cavity

deflated.  The incision was closed with 4-0 Vicryl skin stitch, which was also used to fix

the drain.  The patient was taken to the recovery room in a satisfactory condition.  There

were no evident complications.

 

Physician assistant, Dorys Templeton, played an essential role in assisting in this case,

helping to position the patient, retract structures as needed, as well as suturing and

cutting sutures when indicated.  Her presence improved patient safety and decreased the

operative time.

 

 

 

 

Rosendo Cobb MD

DD:  02/25/2018 21:49:07

DT:  02/26/2018 12:27:51

Job #:  [id number]